# Patient Record
Sex: MALE | URBAN - METROPOLITAN AREA
[De-identification: names, ages, dates, MRNs, and addresses within clinical notes are randomized per-mention and may not be internally consistent; named-entity substitution may affect disease eponyms.]

---

## 2022-10-07 ENCOUNTER — OFFICE VISIT (OUTPATIENT)
Dept: URBAN - METROPOLITAN AREA CLINIC 86 | Facility: CLINIC | Age: 72
End: 2022-10-07
Payer: COMMERCIAL

## 2022-10-07 DIAGNOSIS — H35.372 PUCKERING OF MACULA, LEFT EYE: Primary | ICD-10-CM

## 2022-10-07 DIAGNOSIS — Z96.1 PRESENCE OF INTRAOCULAR LENS: ICD-10-CM

## 2022-10-07 PROCEDURE — 99204 OFFICE O/P NEW MOD 45 MIN: CPT | Performed by: OPHTHALMOLOGY

## 2022-10-07 PROCEDURE — 92134 CPTRZ OPH DX IMG PST SGM RTA: CPT | Performed by: OPHTHALMOLOGY

## 2022-10-07 ASSESSMENT — INTRAOCULAR PRESSURE
OS: 14
OD: 13

## 2022-10-07 NOTE — IMPRESSION/PLAN
Impression: Puckering of macula, left eye: H35.372. Left. OCT: 
OD: wnl OS: ERM Plan: Examination and OCT reveals an ERM which is distorting the macular contour. The diagnosis, natural history, and prognosis of epiretinal membranes, as well as the risks and benefits of vitrectomy with membrane peeling versus observation were discussed at length. Given the patient's current visual acuity and minimal hindrance on activities of daily living, observation was recommended at this time. 

RTC 6 months DFE/OCT OU re-eval